# Patient Record
Sex: FEMALE | Race: WHITE
[De-identification: names, ages, dates, MRNs, and addresses within clinical notes are randomized per-mention and may not be internally consistent; named-entity substitution may affect disease eponyms.]

---

## 2020-12-09 ENCOUNTER — APPOINTMENT (OUTPATIENT)
Dept: OBGYN | Facility: CLINIC | Age: 20
End: 2020-12-09
Payer: COMMERCIAL

## 2020-12-09 VITALS
SYSTOLIC BLOOD PRESSURE: 112 MMHG | RESPIRATION RATE: 16 BRPM | HEIGHT: 66 IN | WEIGHT: 168 LBS | DIASTOLIC BLOOD PRESSURE: 66 MMHG | BODY MASS INDEX: 27 KG/M2

## 2020-12-09 PROCEDURE — 81025 URINE PREGNANCY TEST: CPT

## 2020-12-09 PROCEDURE — 99385 PREV VISIT NEW AGE 18-39: CPT

## 2020-12-09 PROCEDURE — 99072 ADDL SUPL MATRL&STAF TM PHE: CPT

## 2020-12-09 PROCEDURE — 99214 OFFICE O/P EST MOD 30 MIN: CPT | Mod: 25

## 2020-12-09 NOTE — DISCUSSION/SUMMARY
[FreeTextEntry1] : 21 YO PRESENTS FOR OCP INITIATION. PT COMPLAINS OF DEPRESSION ON MENSES \par UCG- NEGATIVE \par \par CBE WNL, SBE TEACHING CONDUCTED WITH RETURN DEMONSTRATION\par \par CONTRACEPTION OPTIONS DISCUSSED INCLUDING BUT NOT LIMITED TO PATCH, DEPO PROVERA, IMPLANTATION, IUD AND NUVA RING \par EDUCATION ON SAFE SEX PRACTICES INCLUDING ADMINISTRATION OF PILLS AND CONDOM USE, PATIENT STATES UNDERSTANDING\par \par PATIENT DECLINES STD TESTING AT THIS TIME- PT IS VIRGINAL \par PATIENT DENIES HX OF MIGRAINES WITH AURAS, PE, DVT OR CIGARETTE SMOKING\par \par ACHES DISCUSSED IN GREAT DETAIL, PATIENT STATES UNDERSTANDING.\par PT DECLINES DESIRE OF HOMICIDAL IDEATIONS- STATES HAS GOOD SUPPORT SYSTEM TO HELP WITH DEPRESSIONS\par OCP JUNEL RX SENT TO PHARMACY WITH INSTRUCTIONS ON USE \par REFERRAL FOR TALK THERAPY GIVEN TO PT WITH STRESSED IMPORTANCE TO F/U \par ALL QUESTIONS ANSWERED TO PATIENT SATISFACTION \par \par RTO IN 3 MOS FOR OCP F/U OR PRN \par \par

## 2020-12-09 NOTE — COUNSELING
[Nutrition/ Exercise/ Weight Management] : nutrition, exercise, weight management [Body Image] : body image [Vitamins/Supplements] : vitamins/supplements [Contraception/ Emergency Contraception/ Safe Sexual Practices] : contraception, emergency contraception, safe sexual practices [Medication Management] : medication management

## 2021-06-11 ENCOUNTER — APPOINTMENT (OUTPATIENT)
Dept: OBGYN | Facility: CLINIC | Age: 21
End: 2021-06-11
Payer: COMMERCIAL

## 2021-06-11 ENCOUNTER — NON-APPOINTMENT (OUTPATIENT)
Age: 21
End: 2021-06-11

## 2021-06-11 VITALS
HEIGHT: 66 IN | SYSTOLIC BLOOD PRESSURE: 110 MMHG | DIASTOLIC BLOOD PRESSURE: 70 MMHG | TEMPERATURE: 97 F | WEIGHT: 152 LBS | BODY MASS INDEX: 24.43 KG/M2

## 2021-06-11 PROCEDURE — 99072 ADDL SUPL MATRL&STAF TM PHE: CPT

## 2021-06-11 PROCEDURE — 99213 OFFICE O/P EST LOW 20 MIN: CPT

## 2021-06-11 NOTE — COUNSELING
[Contraception/ Emergency Contraception/ Safe Sexual Practices] : contraception, emergency contraception, safe sexual practices [Vaccines] : vaccines

## 2021-06-11 NOTE — PLAN
[FreeTextEntry1] : PT DOING WELL ON OCPS. DENIES COMPLAINTS OF DEPRESSION OF OCPS. FEELS BETTER. FOLLOWUP FOR ANNUAL VISIT.

## 2021-12-27 ENCOUNTER — APPOINTMENT (OUTPATIENT)
Dept: OBGYN | Facility: CLINIC | Age: 21
End: 2021-12-27
Payer: COMMERCIAL

## 2021-12-27 VITALS
HEIGHT: 66 IN | HEART RATE: 75 BPM | DIASTOLIC BLOOD PRESSURE: 70 MMHG | RESPIRATION RATE: 14 BRPM | BODY MASS INDEX: 24.91 KG/M2 | SYSTOLIC BLOOD PRESSURE: 110 MMHG | WEIGHT: 155 LBS

## 2021-12-27 PROCEDURE — 99395 PREV VISIT EST AGE 18-39: CPT

## 2021-12-27 NOTE — COUNSELING
[Nutrition/ Exercise/ Weight Management] : nutrition, exercise, weight management [Breast Self Exam] : breast self exam [Bladder Hygiene] : bladder hygiene [Contraception/ Emergency Contraception/ Safe Sexual Practices] : contraception, emergency contraception, safe sexual practices [STD (testing, results, tx)] : STD (testing, results, tx) [Vaccines] : vaccines [Influenza Vaccine] : influenza vaccine

## 2021-12-27 NOTE — PLAN
[FreeTextEntry1] : PATIENT PRESENTS FOR ANNUAL VISIT. PATIENT DOING WELL.. SELF BREAST EXAM RECOMMENDED. DIET AND EXERCISE DISCUSSED. PATIENT ADVISED TO CONTACT WITH ANY CHANGES IN HEALTH. CONTRACEPTION NEEDS DISCUSSED. DISCUSSED VAGINAL LUBRICANTS

## 2021-12-27 NOTE — PHYSICAL EXAM
[Appropriately responsive] : appropriately responsive [Alert] : alert [No Acute Distress] : no acute distress [No Lymphadenopathy] : no lymphadenopathy [Regular Rate Rhythm] : regular rate rhythm [No Murmurs] : no murmurs [Clear to Auscultation B/L] : clear to auscultation bilaterally [Soft] : soft [Non-tender] : non-tender [Non-distended] : non-distended [No HSM] : No HSM [No Lesions] : no lesions [No Mass] : no mass [Oriented x3] : oriented x3 [Examination Of The Breasts] : a normal appearance [No Masses] : no breast masses were palpable [Labia Majora] : normal [Labia Minora] : normal [Normal] : normal [Uterine Adnexae] : normal [FreeTextEntry4] : HYMENAL RING INTACT

## 2022-01-03 LAB
C TRACH RRNA SPEC QL NAA+PROBE: NOT DETECTED
N GONORRHOEA RRNA SPEC QL NAA+PROBE: NOT DETECTED
SOURCE TP AMPLIFICATION: NORMAL

## 2022-06-13 ENCOUNTER — RESULT REVIEW (OUTPATIENT)
Age: 22
End: 2022-06-13

## 2022-06-13 ENCOUNTER — APPOINTMENT (OUTPATIENT)
Dept: OBGYN | Facility: CLINIC | Age: 22
End: 2022-06-13
Payer: COMMERCIAL

## 2022-06-13 VITALS
BODY MASS INDEX: 26.03 KG/M2 | SYSTOLIC BLOOD PRESSURE: 110 MMHG | TEMPERATURE: 98 F | WEIGHT: 162 LBS | HEART RATE: 75 BPM | RESPIRATION RATE: 14 BRPM | HEIGHT: 66 IN | DIASTOLIC BLOOD PRESSURE: 70 MMHG

## 2022-06-13 DIAGNOSIS — Z30.41 ENCOUNTER FOR SURVEILLANCE OF CONTRACEPTIVE PILLS: ICD-10-CM

## 2022-06-13 DIAGNOSIS — N64.4 MASTODYNIA: ICD-10-CM

## 2022-06-13 PROCEDURE — 99214 OFFICE O/P EST MOD 30 MIN: CPT

## 2022-06-13 NOTE — HISTORY OF PRESENT ILLNESS
[FreeTextEntry1] : 20 yo here for ocp check up. She is doing well on junel1/20 and would like a refill. She has noticed pain to right nipple over the past 3-4 days. She denies nipple dc and does not feel any lumps.

## 2022-06-13 NOTE — PLAN
[FreeTextEntry1] : Right nipple irritation,no new products used, warm compressed, breast sono ordered- rt if symptoms worsen\par \par ocp refill-[facility] ,dvt emboli risks reviewed rt in 6 months

## 2022-06-21 ENCOUNTER — RESULT REVIEW (OUTPATIENT)
Age: 22
End: 2022-06-21

## 2022-06-21 ENCOUNTER — APPOINTMENT (OUTPATIENT)
Dept: ULTRASOUND IMAGING | Facility: CLINIC | Age: 22
End: 2022-06-21
Payer: COMMERCIAL

## 2022-06-21 ENCOUNTER — OUTPATIENT (OUTPATIENT)
Dept: OUTPATIENT SERVICES | Facility: HOSPITAL | Age: 22
LOS: 1 days | End: 2022-06-21
Payer: COMMERCIAL

## 2022-06-21 DIAGNOSIS — N64.4 MASTODYNIA: ICD-10-CM

## 2022-06-21 PROCEDURE — 76642 ULTRASOUND BREAST LIMITED: CPT

## 2022-06-21 PROCEDURE — 76642 ULTRASOUND BREAST LIMITED: CPT | Mod: 26,LT

## 2022-12-16 NOTE — REVIEW OF SYSTEMS
No [Patient Intake Form Reviewed] : Patient intake form was reviewed [Pelvic pain] : pelvic pain [Negative] : Heme/Lymph

## 2022-12-30 ENCOUNTER — APPOINTMENT (OUTPATIENT)
Dept: OBGYN | Facility: CLINIC | Age: 22
End: 2022-12-30

## 2023-01-09 ENCOUNTER — APPOINTMENT (OUTPATIENT)
Dept: OBGYN | Facility: CLINIC | Age: 23
End: 2023-01-09
Payer: COMMERCIAL

## 2023-01-09 VITALS — DIASTOLIC BLOOD PRESSURE: 70 MMHG | TEMPERATURE: 97.8 F | WEIGHT: 172 LBS | SYSTOLIC BLOOD PRESSURE: 112 MMHG

## 2023-01-09 PROCEDURE — 99395 PREV VISIT EST AGE 18-39: CPT

## 2023-01-09 NOTE — PLAN
[FreeTextEntry1] : \par Well woman visit\par \par pap done\par std cultures done\par OCP renewed-[facility]AD, I discussed the risk of dvt and emboli  from ocp\par rt in 1 year\par

## 2023-01-09 NOTE — HISTORY OF PRESENT ILLNESS
[FreeTextEntry1] : 21 yo here for av. she is sexually active and is on Junel 1/20. She has no complaints today other than sometimes with sex, vagina feels narrow, not a lot of pain though.\par \par \par She has a family h/o breast ca-mom,age 53 approx.

## 2023-01-16 LAB — CYTOLOGY CVX/VAG DOC THIN PREP: NORMAL

## 2023-10-24 ENCOUNTER — APPOINTMENT (OUTPATIENT)
Dept: INTERNAL MEDICINE | Facility: CLINIC | Age: 23
End: 2023-10-24
Payer: COMMERCIAL

## 2023-10-24 ENCOUNTER — NON-APPOINTMENT (OUTPATIENT)
Age: 23
End: 2023-10-24

## 2023-10-24 VITALS
WEIGHT: 170 LBS | OXYGEN SATURATION: 99 % | BODY MASS INDEX: 27.32 KG/M2 | HEIGHT: 66 IN | HEART RATE: 90 BPM | SYSTOLIC BLOOD PRESSURE: 112 MMHG | DIASTOLIC BLOOD PRESSURE: 72 MMHG | TEMPERATURE: 98.7 F

## 2023-10-24 DIAGNOSIS — Z30.09 ENCOUNTER FOR OTHER GENERAL COUNSELING AND ADVICE ON CONTRACEPTION: ICD-10-CM

## 2023-10-24 DIAGNOSIS — Z00.00 ENCOUNTER FOR GENERAL ADULT MEDICAL EXAMINATION W/OUT ABNORMAL FINDINGS: ICD-10-CM

## 2023-10-24 DIAGNOSIS — Z86.59 PERSONAL HISTORY OF OTHER MENTAL AND BEHAVIORAL DISORDERS: ICD-10-CM

## 2023-10-24 DIAGNOSIS — Z32.02 ENCOUNTER FOR PREGNANCY TEST, RESULT NEGATIVE: ICD-10-CM

## 2023-10-24 DIAGNOSIS — Z01.419 ENCOUNTER FOR GYNECOLOGICAL EXAMINATION (GENERAL) (ROUTINE) W/OUT ABNORMAL FINDINGS: ICD-10-CM

## 2023-10-24 DIAGNOSIS — Z78.9 OTHER SPECIFIED HEALTH STATUS: ICD-10-CM

## 2023-10-24 DIAGNOSIS — R53.83 OTHER FATIGUE: ICD-10-CM

## 2023-10-24 DIAGNOSIS — Z23 ENCOUNTER FOR IMMUNIZATION: ICD-10-CM

## 2023-10-24 DIAGNOSIS — Z80.3 FAMILY HISTORY OF MALIGNANT NEOPLASM OF BREAST: ICD-10-CM

## 2023-10-24 PROCEDURE — 90472 IMMUNIZATION ADMIN EACH ADD: CPT

## 2023-10-24 PROCEDURE — G0008: CPT | Mod: 59

## 2023-10-24 PROCEDURE — 99385 PREV VISIT NEW AGE 18-39: CPT | Mod: 25

## 2023-10-24 PROCEDURE — 90686 IIV4 VACC NO PRSV 0.5 ML IM: CPT

## 2023-10-24 PROCEDURE — 90715 TDAP VACCINE 7 YRS/> IM: CPT

## 2023-11-20 ENCOUNTER — LABORATORY RESULT (OUTPATIENT)
Age: 23
End: 2023-11-20

## 2023-11-27 DIAGNOSIS — N39.0 URINARY TRACT INFECTION, SITE NOT SPECIFIED: ICD-10-CM

## 2023-11-27 LAB
25(OH)D3 SERPL-MCNC: 25.8 NG/ML
ALBUMIN SERPL ELPH-MCNC: 4.8 G/DL
ALP BLD-CCNC: 47 U/L
ALT SERPL-CCNC: 14 U/L
ANION GAP SERPL CALC-SCNC: 11 MMOL/L
APPEARANCE: ABNORMAL
AST SERPL-CCNC: 13 U/L
BASOPHILS # BLD AUTO: 0.03 K/UL
BASOPHILS NFR BLD AUTO: 0.5 %
BILIRUB SERPL-MCNC: 0.2 MG/DL
BILIRUBIN URINE: NEGATIVE
BLOOD URINE: ABNORMAL
BUN SERPL-MCNC: 8 MG/DL
CALCIUM SERPL-MCNC: 9.7 MG/DL
CHLORIDE SERPL-SCNC: 103 MMOL/L
CHOLEST SERPL-MCNC: 223 MG/DL
CO2 SERPL-SCNC: 24 MMOL/L
COLOR: YELLOW
CREAT SERPL-MCNC: 0.64 MG/DL
EGFR: 127 ML/MIN/1.73M2
EOSINOPHIL # BLD AUTO: 0.06 K/UL
EOSINOPHIL NFR BLD AUTO: 1 %
FERRITIN SERPL-MCNC: 71 NG/ML
GLUCOSE QUALITATIVE U: NEGATIVE MG/DL
GLUCOSE SERPL-MCNC: 80 MG/DL
HCT VFR BLD CALC: 40 %
HDLC SERPL-MCNC: 63 MG/DL
HGB BLD-MCNC: 13.4 G/DL
IMM GRANULOCYTES NFR BLD AUTO: 0.2 %
KETONES URINE: NEGATIVE MG/DL
LDLC SERPL CALC-MCNC: 143 MG/DL
LEUKOCYTE ESTERASE URINE: ABNORMAL
LYMPHOCYTES # BLD AUTO: 2 K/UL
LYMPHOCYTES NFR BLD AUTO: 32.7 %
MAN DIFF?: NORMAL
MCHC RBC-ENTMCNC: 30 PG
MCHC RBC-ENTMCNC: 33.5 GM/DL
MCV RBC AUTO: 89.7 FL
MONOCYTES # BLD AUTO: 0.54 K/UL
MONOCYTES NFR BLD AUTO: 8.8 %
NEUTROPHILS # BLD AUTO: 3.47 K/UL
NEUTROPHILS NFR BLD AUTO: 56.8 %
NITRITE URINE: NEGATIVE
NONHDLC SERPL-MCNC: 160 MG/DL
PH URINE: 5.5
PLATELET # BLD AUTO: 333 K/UL
POTASSIUM SERPL-SCNC: 4 MMOL/L
PROT SERPL-MCNC: 7.2 G/DL
PROTEIN URINE: 30 MG/DL
RBC # BLD: 4.46 M/UL
RBC # FLD: 12.5 %
SODIUM SERPL-SCNC: 138 MMOL/L
SPECIFIC GRAVITY URINE: 1.02
T4 FREE SERPL-MCNC: 1.1 NG/DL
TRIGL SERPL-MCNC: 95 MG/DL
TSH SERPL-ACNC: 1.12 UIU/ML
UROBILINOGEN URINE: 0.2 MG/DL
WBC # FLD AUTO: 6.11 K/UL

## 2023-11-27 RX ORDER — AMOXICILLIN AND CLAVULANATE POTASSIUM 875; 125 MG/1; MG/1
875-125 TABLET, COATED ORAL
Qty: 14 | Refills: 0 | Status: ACTIVE | COMMUNITY
Start: 2023-11-27 | End: 1900-01-01

## 2023-12-13 DIAGNOSIS — R82.90 UNSPECIFIED ABNORMAL FINDINGS IN URINE: ICD-10-CM

## 2023-12-13 LAB
APPEARANCE: CLEAR
BACTERIA UR CULT: NORMAL
BACTERIA: NEGATIVE /HPF
BILIRUBIN URINE: NEGATIVE
BLOOD URINE: ABNORMAL
CAST: 0 /LPF
COLOR: YELLOW
EPITHELIAL CELLS: 5 /HPF
GLUCOSE QUALITATIVE U: NEGATIVE MG/DL
KETONES URINE: NEGATIVE MG/DL
LEUKOCYTE ESTERASE URINE: ABNORMAL
MICROSCOPIC-UA: NORMAL
NITRITE URINE: NEGATIVE
PH URINE: 6
PROTEIN URINE: NEGATIVE MG/DL
RED BLOOD CELLS URINE: 2 /HPF
REVIEW: NORMAL
SPECIFIC GRAVITY URINE: 1.02
UROBILINOGEN URINE: 0.2 MG/DL
WHITE BLOOD CELLS URINE: 4 /HPF

## 2024-01-12 ENCOUNTER — APPOINTMENT (OUTPATIENT)
Dept: OBGYN | Facility: CLINIC | Age: 24
End: 2024-01-12

## 2024-01-19 ENCOUNTER — APPOINTMENT (OUTPATIENT)
Dept: OBGYN | Facility: CLINIC | Age: 24
End: 2024-01-19
Payer: COMMERCIAL

## 2024-01-19 VITALS
DIASTOLIC BLOOD PRESSURE: 68 MMHG | BODY MASS INDEX: 27 KG/M2 | HEART RATE: 76 BPM | SYSTOLIC BLOOD PRESSURE: 116 MMHG | WEIGHT: 168 LBS | RESPIRATION RATE: 18 BRPM | HEIGHT: 66 IN

## 2024-01-19 DIAGNOSIS — Z01.419 ENCOUNTER FOR GYNECOLOGICAL EXAMINATION (GENERAL) (ROUTINE) W/OUT ABNORMAL FINDINGS: ICD-10-CM

## 2024-01-19 PROCEDURE — 99395 PREV VISIT EST AGE 18-39: CPT

## 2024-01-19 RX ORDER — NORETHINDRONE ACETATE AND ETHINYL ESTRADIOL 1; 20 MG/1; UG/1
1-20 TABLET ORAL DAILY
Qty: 90 | Refills: 3 | Status: ACTIVE | COMMUNITY
Start: 2020-12-09 | End: 1900-01-01

## 2024-01-19 NOTE — REVIEW OF SYSTEMS
[Patient Intake Form Reviewed] : Patient intake form was reviewed [Pelvic pain] : no pelvic pain [Negative] : Heme/Lymph

## 2024-01-19 NOTE — PLAN
[FreeTextEntry1] : PATIENT PRESENTS FOR ANNUAL VISIT. PT DENIES ANY COMPLAINTS WITH CURRENT BIRTH CONTROL.  PATIENT INSTRUCTED ON SELF BREAST EXAM, DIET AND EXERCISE.

## 2024-01-24 LAB — CYTOLOGY CVX/VAG DOC THIN PREP: NORMAL

## 2024-09-11 ENCOUNTER — RX RENEWAL (OUTPATIENT)
Age: 24
End: 2024-09-11

## 2024-11-15 ENCOUNTER — APPOINTMENT (OUTPATIENT)
Dept: INTERNAL MEDICINE | Facility: CLINIC | Age: 24
End: 2024-11-15
Payer: COMMERCIAL

## 2024-11-15 VITALS
DIASTOLIC BLOOD PRESSURE: 78 MMHG | HEIGHT: 66 IN | HEART RATE: 93 BPM | OXYGEN SATURATION: 98 % | SYSTOLIC BLOOD PRESSURE: 118 MMHG | TEMPERATURE: 97.7 F | WEIGHT: 181 LBS | BODY MASS INDEX: 29.09 KG/M2

## 2024-11-15 DIAGNOSIS — N64.4 MASTODYNIA: ICD-10-CM

## 2024-11-15 DIAGNOSIS — E55.9 VITAMIN D DEFICIENCY, UNSPECIFIED: ICD-10-CM

## 2024-11-15 DIAGNOSIS — E78.5 HYPERLIPIDEMIA, UNSPECIFIED: ICD-10-CM

## 2024-11-15 DIAGNOSIS — R53.83 OTHER FATIGUE: ICD-10-CM

## 2024-11-15 DIAGNOSIS — R82.90 UNSPECIFIED ABNORMAL FINDINGS IN URINE: ICD-10-CM

## 2024-11-15 DIAGNOSIS — Z00.00 ENCOUNTER FOR GENERAL ADULT MEDICAL EXAMINATION W/OUT ABNORMAL FINDINGS: ICD-10-CM

## 2024-11-15 DIAGNOSIS — Z01.419 ENCOUNTER FOR GYNECOLOGICAL EXAMINATION (GENERAL) (ROUTINE) W/OUT ABNORMAL FINDINGS: ICD-10-CM

## 2024-11-15 PROCEDURE — 99395 PREV VISIT EST AGE 18-39: CPT

## 2024-12-23 ENCOUNTER — RX RENEWAL (OUTPATIENT)
Age: 24
End: 2024-12-23

## 2025-01-30 ENCOUNTER — EMERGENCY (EMERGENCY)
Facility: HOSPITAL | Age: 25
LOS: 0 days | Discharge: HOME | End: 2025-01-30
Attending: EMERGENCY MEDICINE
Payer: COMMERCIAL

## 2025-01-30 VITALS
DIASTOLIC BLOOD PRESSURE: 91 MMHG | TEMPERATURE: 98 F | OXYGEN SATURATION: 100 % | RESPIRATION RATE: 18 BRPM | HEART RATE: 95 BPM | SYSTOLIC BLOOD PRESSURE: 137 MMHG

## 2025-01-30 VITALS — WEIGHT: 189.6 LBS

## 2025-01-30 DIAGNOSIS — S01.511A LACERATION WITHOUT FOREIGN BODY OF LIP, INITIAL ENCOUNTER: ICD-10-CM
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
## 2025-01-30 PROCEDURE — 12011 RPR F/E/E/N/L/M 2.5 CM/<: CPT

## 2025-01-30 PROCEDURE — 99282 EMERGENCY DEPT VISIT SF MDM: CPT | Mod: 25

## 2025-01-30 PROCEDURE — 99284 EMERGENCY DEPT VISIT MOD MDM: CPT | Mod: 25

## 2025-01-30 NOTE — ED ADULT TRIAGE NOTE - CHIEF COMPLAINT QUOTE
Pt presents to the ED with c/o lac to upper lip s/p trip and hitting it on desk. Bleeding controlled upon assessment. Pt denies any medication PTA. -LOC, -blood thinner. Pt states she is UTD on tetanus.

## 2025-01-30 NOTE — ED STATDOCS - PROGRESS NOTE DETAILS
Erik PGY3: Patient numbed and tolerated 4 5-0 vicryl stitches to vermillion border with bacitracin applied topically

## 2025-01-30 NOTE — ED STATDOCS - SKIN, MLM
skin normal color for race, warm, dry and intact. 1.5cm lac upper lip through vermillion border, not through and through, no facial deformity noted, normal dentition, no infection, bleeding, or foreign body

## 2025-01-30 NOTE — ED STATDOCS - OBJECTIVE STATEMENT
23 y/o F with no pertinent PMHx presents to the ED c/o upper lip laceration s/p trip and fall 45 minutes ago. States she tripped in her room and fell into her desk. No LOC. States her teeth feel okay.

## 2025-01-30 NOTE — ED STATDOCS - NSFOLLOWUPINSTRUCTIONS_ED_ALL_ED_FT
You were seen in the Emergency Department for a lip laceration. You were numbed with lidocaine, the wound was cleansed, and you underwent 4 absorbable stitches and had bacitracin applied over top. You feel okay and are clear for discharge.  Keep site clean/dry.  Pat dry if becomes wet.  May apply antibiotic ointment for comfort.  Do not apply hydrogen peroxide/alcohol/betadine to wound.  Return to ED for any increased pain, redness, swelling, drainage or concern for wound separation.    To control your pain at home, you should take Ibuprofen 600 mg along with Tylenol 650mg-1000mg every 6 to 8 hours as needed. Limit your maximum daily Tylenol from all sources to 4000mg. Be aware that many other medications contain acetaminophen which is also known as Tylenol. Taking Tylenol and Ibuprofen together has been shown to be more effective at relieving pain than taking them separately. These are both over the counter medications that you can  at your local pharmacy without a prescription. You need to respect all of the warnings on the bottles. You shouldn’t take these medications for more than a week without following up with your doctor. Both medications come with certain risks and side effects that you need to discuss with your doctor, especially if you are taking them for a prolonged period.    1) Continue all previously prescribed medications as directed.    2) Follow up with your primary care physician - take copies of your results.    3) Return to the Emergency Department for worsening or persistent symptoms, and/or ANY NEW OR CONCERNING SYMPTOMS.

## 2025-01-30 NOTE — ED PROCEDURE NOTE - CPROC ED TOLERANCE1
Hpi Title: Evaluation of Skin Lesions How Severe Are Your Spot(S)?: mild Have Your Spot(S) Been Treated In The Past?: has not been treated Patient tolerated procedure well.

## 2025-01-30 NOTE — ED STATDOCS - PATIENT PORTAL LINK FT
You can access the FollowMyHealth Patient Portal offered by [facility] by registering at the following website: [email or web address]. By joining [facility]’s FollowMyHealth portal, you will also be able to view your health information using other applications (apps) compatible with our system.

## 2025-01-30 NOTE — ED STATDOCS - CLINICAL SUMMARY MEDICAL DECISION MAKING FREE TEXT BOX
Patient with lip laceration, had requested plastic surgery, however did not want to pay plastics fee.  Laceration repaired in ED without complication.  Discharged home in good condition.  Strict return precautions given for any worsening.

## 2025-03-10 ENCOUNTER — RX RENEWAL (OUTPATIENT)
Age: 25
End: 2025-03-10

## 2025-03-18 ENCOUNTER — NON-APPOINTMENT (OUTPATIENT)
Age: 25
End: 2025-03-18

## 2025-03-18 ENCOUNTER — APPOINTMENT (OUTPATIENT)
Dept: OBGYN | Facility: CLINIC | Age: 25
End: 2025-03-18

## 2025-03-18 VITALS
BODY MASS INDEX: 28.93 KG/M2 | HEIGHT: 66 IN | DIASTOLIC BLOOD PRESSURE: 66 MMHG | WEIGHT: 180 LBS | SYSTOLIC BLOOD PRESSURE: 112 MMHG

## 2025-03-18 DIAGNOSIS — Z30.41 ENCOUNTER FOR SURVEILLANCE OF CONTRACEPTIVE PILLS: ICD-10-CM

## 2025-03-18 DIAGNOSIS — Z01.419 ENCOUNTER FOR GYNECOLOGICAL EXAMINATION (GENERAL) (ROUTINE) W/OUT ABNORMAL FINDINGS: ICD-10-CM

## 2025-03-18 LAB
HCG UR QL: NEGATIVE
QUALITY CONTROL: YES

## 2025-03-18 PROCEDURE — 99395 PREV VISIT EST AGE 18-39: CPT

## 2025-03-18 PROCEDURE — 81025 URINE PREGNANCY TEST: CPT

## 2025-03-20 LAB — CYTOLOGY CVX/VAG DOC THIN PREP: ABNORMAL
